# Patient Record
Sex: MALE | Race: BLACK OR AFRICAN AMERICAN | Employment: UNEMPLOYED | URBAN - METROPOLITAN AREA
[De-identification: names, ages, dates, MRNs, and addresses within clinical notes are randomized per-mention and may not be internally consistent; named-entity substitution may affect disease eponyms.]

---

## 2019-04-15 ENCOUNTER — HOSPITAL ENCOUNTER (OUTPATIENT)
Dept: PHYSICAL THERAPY | Age: 24
Discharge: HOME | End: 2019-04-15
Payer: MEDICAID

## 2019-04-15 PROCEDURE — 97161 PT EVAL LOW COMPLEX 20 MIN: CPT

## 2019-04-15 PROCEDURE — 97110 THERAPEUTIC EXERCISES: CPT

## 2019-04-15 NOTE — PROGRESS NOTES
[address] OUTPATIENT physical Therapy Initial evaluation NAME: Darlin Gandhi AGE: 26 y.o. GENDER: male DATE: 4/15/2019 REFERRING PHYSICIAN: Rocio Powell MD 
 
OBJECTIVE DATA SUMMARY:  
Medical Diagnosis: Low back pain (M54.5), PT Diagnosis: Pain affecting function Date of Onset: Has been bad for about 1 year, prior to that, on and off Mechanism of Injury/Chief Complaint:  Gradual onset, Upper and lower back pain, CC is pain between shoulder blades. Present Symptoms: Low back hurting right now, not as much between shoulder blades. When shoulder blade area hurts, it is very uncomfortable Functional Deficits and Limitations:  
[x]     Sitting:   []    Dressing:   []    Reaching: 
[]     Standing:   []     Bathing:   []    Lifting: 
[]     Walking:   []     Cooking:   []    Yardwork: 
[x]     Sleeping:   []     Cleaning:   []     Driving: 
[]     Work Tasks:  [x]     Recreation:  []    Other: 
HISTORY: 
Past Medical History: No past medical history on file. No past surgical history on file. Precautions: none Current Medications:  none Prior Level of Function/Home Situation: Independent Personal factors and/or comorbidities impacting plan of care: none Social/Work History:  Not working, Previous Therapy:  No 
 
SUBJECTIVE:  
Low back is hurting today, upper back comes and goes I like to work on cars, and I like to go to the gym but have not been in a few weeks Patients goals for therapy: Feel better OBJECTIVE DATA SUMMARY:  
EXAMINATION/PRESENTATION/DECISION MAKING:  
Pain: 
Location: 
Quality: aching, sharp Now: 4/10 Best: Worst:7/10, upper back can be 10/10 Factors that improve pain: rest 
 
Posture:  
Head forward, rounded shoulders and increased thoracic kyphosis Strength: B/L LE knee extension, an dankle dorsiflexion @NL Range of Motion:  
Trunk Flexion  WNL B/L SB WNL Extension 50% of normal 
 
 
Spinal Assessment:  
Increased Thoracic kyphosis Joint Mobility: Hypomobility of thoracic spine Palpation:  
TTP left lumbar paraspinals and quadratus. B/L thoracic paraspinals, rhomboids Neurologic Assessment: 
 Tone: WNL Sensation: WNL Reflexes: not tested Special Tests:  
+ slump test on Right Mobility: 
 Transitional Movements: WNL  Gait: WNL Balance:  Not tested Functional Measure:  
[facility] 5/24 Physical Therapy Evaluation Charge Determination History Examination Presentation Decision-Making LOW Complexity : Zero comorbidities / personal factors that will impact the outcome / POC LOW Complexity : 1-2 Standardized tests and measures addressing body structure, function, activity limitation and / or participation in recreation  LOW Complexity : Stable, uncomplicated  LOW Complexity : FOTO score of  Based on the above components, the patient evaluation is determined to be of the following complexity level: LOW  
 
TREATMENT/INTERVENTION: 
Modalities (Rationale): MHP post treatment to decrease pain and muscle guarding Therapeutic Exercises: HEP  Resistive band rowing with blue TB (dispensed), Thoracolumbar SB, Segmental Flexion/ext over back of chair with vertical support, Lumbar rotation stretch, Throacic self mob with towel roll, thoracic twist with pelvis stable, angry cat, child's pose Manual Therapy: 
None this date Neuro Re-Education: 
Discussed importance of postural alignment and use of lumbar support to improve sitting posture. Balance Training: 
none Ambulation/Gait Training: 
none Activity tolerance and post treatment pain report:  
Good Education: 
[x]     Home exercise program provided. Education was provided to the patient on the following topics: Importance of strengthening postural muscles to decrease pain prior to returning to gym. Patient verbalized understanding of the topics presented. ASSESSMENT:  
Jame Schumacher is a 26 y.o. male who presents with thoracic and lumbar pain. Pt reports that the pain has been ongoing for about a year, insidious onset. Jerry Butler Physical therapy problems based on objective data include: pain affecting function and decrease flexibility/ joint mobility . Patient will benefit from skilled intervention to address these impairments. Rehabilitation potential is considered to be Excellent. Factors which may influence rehabilitation potential include none . Patient will benefit from physical therapy visits 2 times per week over 6 weeks to optimize improvement in these areas. PLAN OF CARE:  
Recommendations and Planned Interventions: 
[]     Therapeutic Activities  [x]     Heat/Ice [x]     Therapeutic Exercises  []     Ultrasound 
[]     Gait training  []     E-stim 
[]     Balance training  [x]     Home exercise program 
[x]     Manual Therapy  []     TENS [x]     Neuro Re-Ed  []     Edema management 
[x]     Posture/Biomechanics  [x]     Pain management 
[x]     Traction  []     Other: 
 
Frequency/Duration:  Patient will be followed by physical therapy 2 times a week for  6 weeks to address goals. GOALS Short term goals Time frame: 3 weeks 1. Patient will be compliant and independent with the initial HEP as evidenced by being able to perform without cuing. 2. Patient will report a 50% improvement in symptoms. 3. Patient report a 50% improvement in sleeping. 4. Patient will tolerate 15 minutes of clinic activities before onset of symptoms. Long term goals Time frame: 6 weeks 1. Patient will report pain level decrease to 2/10 to allow increased ease of movement. 2. Patient will have an improved score on the Gadiel Octave outcome measure by 3 points to demonstrate an increase in functional activity tolerance. 3. Patient will be independent in final individualized HEP. 4. Patient will return to gym without being limited by symptoms. 5. Patient will sleep 6-8 hours without being interrupted by pain. [x]     Patient has participated in goal setting and agrees to work toward plan of care. []     Patient was instructed to call if questions or concerns arise. Thank you for this referral. 
Vishal Arita, PT Time Calculation: 60 mins Patient Time in clinic: 
 Start Time: 1500 Stop Time: 1600 TREATMENT PLAN EFFECTIVE DATES:  
4/15/2019 TO 6/15/2019 I have read the above plan of care for [facility] and certify the need for skilled physical therapy services. Physician Signature: ____________________________________________________ Date: _________________________________________________________________

## 2019-04-26 ENCOUNTER — HOSPITAL ENCOUNTER (OUTPATIENT)
Dept: PHYSICAL THERAPY | Age: 24
Discharge: HOME | End: 2019-04-26
Payer: MEDICAID

## 2019-04-26 PROCEDURE — 97110 THERAPEUTIC EXERCISES: CPT

## 2019-04-26 PROCEDURE — 97140 MANUAL THERAPY 1/> REGIONS: CPT

## 2019-04-26 NOTE — PROGRESS NOTES
[address] OUTPATIENT physical Therapy DAILY Treatment NOTe Visit: 2 NAME: Darlin Gandhi AGE: 26 y.o. GENDER: male DATE: 4/26/2019 REFERRING PHYSICIAN: Monica Moore MD 
 
 
 
GOALS Short term goals Time frame: 3 weeks 1. Patient will be compliant and independent with the initial HEP as evidenced by being able to perform without cuing. 2. Patient will report a 50% improvement in symptoms. 3. Patient report a 50% improvement in sleeping. 4. Patient will tolerate 15 minutes of clinic activities before onset of symptoms. Long term goals Time frame: 6 weeks 1. Patient will report pain level decrease to 2/10 to allow increased ease of movement. 2. Patient will have an improved score on the [facility] outcome measure by 3 points to demonstrate an increase in functional activity tolerance. 3. Patient will be independent in final individualized HEP. 4. Patient will return to gym without being limited by symptoms. 5. Patient will sleep 6-8 hours without being interrupted by pain. SUBJECTIVE: It feels a little better, 8/10 pain I lost the sheet of exercises, I haven't been doing them Pain In: OBJECTIVE DATA SUMMARY:  
 
Pain: 
Location: 
Quality: aching, sharp Now: 4/10 Best: Worst:7/10, upper back can be 10/10 Factors that improve pain: rest 
 
Posture:  
Head forward, rounded shoulders and increased thoracic kyphosis Strength: B/L LE knee extension, an dankle dorsiflexion @NL Range of Motion:  
Trunk Flexion  WNL B/L SB WNL Extension 50% of normal 
 
 
Spinal Assessment:  
Increased Thoracic kyphosis Joint Mobility: Hypomobility of thoracic spine Palpation:  
TTP left lumbar paraspinals and quadratus. B/L thoracic paraspinals, rhomboids Neurologic Assessment: 
 Tone: WNL Sensation: WNL Reflexes: not tested Special Tests:  
+ slump test on Right Mobility: Transitional Movements: WNL  Gait: WNL Balance:  Not tested Functional Measure:  
[facility] 5/24 Physical Therapy Evaluation Charge Determination History Examination Presentation Decision-Making LOW Complexity : Zero comorbidities / personal factors that will impact the outcome / POC LOW Complexity : 1-2 Standardized tests and measures addressing body structure, function, activity limitation and / or participation in recreation  LOW Complexity : Stable, uncomplicated  LOW Complexity : FOTO score of  Based on the above components, the patient evaluation is determined to be of the following complexity level: LOW  
 
TREATMENT/INTERVENTION: 
Modalities (Rationale): MHP post treatment to decrease pain and muscle guarding Therapeutic Exercises: HEP  Resistive band rowing with blue TB (dispensed), Thoracolumbar SB, Segmental Flexion/ext over back of chair with vertical support, Lumbar rotation stretch, Throacic self mob with towel roll, thoracic twist with pelvis stable, angry cat, child's pose Supine Stretch with vertical support of foam roll x 60 seconds Lumbar rotation stretch x 3 reps 15 sec hold Upper body twist with pelvis stable x 10 reps All 4's Angry cat x 8 reps Child's pose x 3 reps 20 sec hold Alt UE extension x 10 reps Prone Elbow prop x 20 sec Seated Flexion/ext over back of chair with vertical support x 7 reps SB stretch x 7 reps Standing Rows green TB x 15 reps Pull downs green TB x 15 rep Corner stretch x 6 reps 15 sec hold LBE/UBE x 5 minutes resistance of 3 Manual Therapy: 
Prone Thoracic mob Grade 2, hypomobile Instructed pt in use of tennis ball for self soft tissue massage, Neuro Re-Education: 
Discussed importance of postural alignment and use of lumbar support to improve sitting posture. Balance Training: 
none Ambulation/Gait Training: 
none Activity tolerance and post treatment pain report:  
Good tolerance Pain Out:  
 
Education: 
Education was provided to the patient on the following topics: importance of exercises, strengthen paraspinal muscles and increase postural awareness. [x]    No changes were made to the home exercise program. 
[]    The following changes were made to the home exercise program:  
Patient verbalized understanding of the topics presented. ASSESSMENT:  
Pt returns today following IE. He reports decreased pain in mid to low back. Mid back pain continues to be cc. Pt reports that he lost HEP and has not been able to do exercises. HEP reviewed and pt given a copy, educated on the importance of exercises. Advanced clinic activities as above. Noted increased thoracic kyphosis with thoracic segment hypomobility. Patients progression toward goals is as follows: 
[]     Improving appropriately and progressing toward goals 
[]     Improving slowly and progressing toward goals 
[]     Not making progress toward goals and plan of care will be adjusted PLAN OF CARE:  
Patient continues to benefit from skilled intervention to address the above impairments. [x]    Continue treatment per established plan of care. []     Recommend the following changes to the plan of care:  
 
Recommendations/Intent for next treatment: Prone thoracic mob, IASTM thoracic paraspinals, strengthen paraspinals, continue postural awareness exercises Alhaji Kong, PT Time Calculation: 50 mins Patient Time in clinic: 
 Start Time:  Stop Time: 1600

## 2019-05-03 ENCOUNTER — APPOINTMENT (OUTPATIENT)
Dept: PHYSICAL THERAPY | Age: 24
End: 2019-05-03

## 2019-06-27 NOTE — PROGRESS NOTES
[facility]  [address] [address] [facility]    OUTPATIENT physical Therapy discharge note      6/27/2019:  Patient will be discharged from physical therapy at this time. Criteria for termination of care:    []           Patient has plateaued  [x]           Patient has not returned to therapy  []           Patient has missed three or more visits without prior notification  []           Other    Patient was seen for 2 visits from 4/15/19 to 4/26/19. Please refer to the most recent progress note for the latest PT info available. If you need anything further faxed to you, please contact us at [phone].     Thank you for this referral.  John Rivera, PT